# Patient Record
Sex: FEMALE | Race: WHITE | NOT HISPANIC OR LATINO | Employment: FULL TIME | URBAN - METROPOLITAN AREA
[De-identification: names, ages, dates, MRNs, and addresses within clinical notes are randomized per-mention and may not be internally consistent; named-entity substitution may affect disease eponyms.]

---

## 2019-10-22 ENCOUNTER — APPOINTMENT (OUTPATIENT)
Dept: RADIOLOGY | Facility: CLINIC | Age: 49
End: 2019-10-22
Payer: COMMERCIAL

## 2019-10-22 ENCOUNTER — OFFICE VISIT (OUTPATIENT)
Dept: OBGYN CLINIC | Facility: CLINIC | Age: 49
End: 2019-10-22
Payer: COMMERCIAL

## 2019-10-22 VITALS
SYSTOLIC BLOOD PRESSURE: 94 MMHG | BODY MASS INDEX: 20.25 KG/M2 | HEIGHT: 67 IN | HEART RATE: 50 BPM | WEIGHT: 129 LBS | DIASTOLIC BLOOD PRESSURE: 59 MMHG

## 2019-10-22 DIAGNOSIS — M79.641 BILATERAL HAND PAIN: ICD-10-CM

## 2019-10-22 DIAGNOSIS — M79.642 BILATERAL HAND PAIN: ICD-10-CM

## 2019-10-22 DIAGNOSIS — M65.341 TRIGGER RING FINGER OF RIGHT HAND: Primary | ICD-10-CM

## 2019-10-22 DIAGNOSIS — S63.611A SPRAIN OF LEFT INDEX FINGER, UNSPECIFIED SITE OF FINGER, INITIAL ENCOUNTER: ICD-10-CM

## 2019-10-22 PROCEDURE — 99203 OFFICE O/P NEW LOW 30 MIN: CPT | Performed by: ORTHOPAEDIC SURGERY

## 2019-10-22 PROCEDURE — 20600 DRAIN/INJ JOINT/BURSA W/O US: CPT | Performed by: ORTHOPAEDIC SURGERY

## 2019-10-22 PROCEDURE — 20550 NJX 1 TENDON SHEATH/LIGAMENT: CPT | Performed by: ORTHOPAEDIC SURGERY

## 2019-10-22 PROCEDURE — 73130 X-RAY EXAM OF HAND: CPT

## 2019-10-22 RX ORDER — TRIAMCINOLONE ACETONIDE 40 MG/ML
20 INJECTION, SUSPENSION INTRA-ARTICULAR; INTRAMUSCULAR
Status: COMPLETED | OUTPATIENT
Start: 2019-10-22 | End: 2019-10-22

## 2019-10-22 RX ORDER — LIDOCAINE HYDROCHLORIDE 5 MG/ML
0.5 INJECTION, SOLUTION INFILTRATION; PERINEURAL
Status: COMPLETED | OUTPATIENT
Start: 2019-10-22 | End: 2019-10-22

## 2019-10-22 RX ADMIN — LIDOCAINE HYDROCHLORIDE 0.5 ML: 5 INJECTION, SOLUTION INFILTRATION; PERINEURAL at 08:46

## 2019-10-22 RX ADMIN — TRIAMCINOLONE ACETONIDE 20 MG: 40 INJECTION, SUSPENSION INTRA-ARTICULAR; INTRAMUSCULAR at 08:46

## 2019-10-22 NOTE — PROGRESS NOTES
Assessment/Plan:  1  Trigger ring finger of right hand  Hand/upper extremity injection: R ring A1   2  Sprain of left index finger, unspecified site of finger, initial encounter  Small joint arthrocentesis: L index MCP   3  Bilateral hand pain  XR hand 3+ vw right    XR hand 3+ vw left       Scribe Attestation    I,:   Mady Sutton MA am acting as a scribe while in the presence of the attending physician :        I,:   Suzy Person, DO personally performed the services described in this documentation    as scribed in my presence :              I discussed with Monica Chnog today that her signs and symptoms are consistent with right ring finger trigger finger and left index finger sprain  She is tender to palpation over the right A1 pulley as well as obvious triggering  She is tender to palpation of left index RCL  Treatment options were discussed in the form of steroid injections  She was agreeable to this and these were performed in the office today without any complications  She will follow up in 3 months for repeat evaluation  Subjective:   Louise Garza is a 47 y o  female who presents to the office today for evaluation of bilateral hand pain  She notes triggering to her right ring finger  She states this has been ongoing since 2016  She states she will have to use the opposite hand to unlock the finger  She states she did try acupuncture for this in the past  She notes pain to the left index finger MCP  She states this has been ongoing since June when she was working a lot of days in a row  Patient does work as a dental hygienist        Review of Systems   Constitutional: Negative for chills and fever  HENT: Negative for drooling and sneezing  Eyes: Negative for redness  Respiratory: Negative for cough and wheezing  Gastrointestinal: Negative for nausea and vomiting  Musculoskeletal: Positive for arthralgias and myalgias  Negative for joint swelling     Neurological: Negative for weakness and numbness  Psychiatric/Behavioral: Negative for behavioral problems  The patient is not nervous/anxious  Past Medical History:   Diagnosis Date    Mcdonald's esophagus        Past Surgical History:   Procedure Laterality Date    HYSTERECTOMY  2017    TONSILECTOMY AND ADNOIDECTOMY         Family History   Problem Relation Age of Onset    Cancer Mother     Hypertension Mother     Osteoarthritis Father     No Known Problems Sister     No Known Problems Brother     No Known Problems Maternal Aunt     No Known Problems Maternal Uncle     No Known Problems Paternal Aunt     No Known Problems Paternal Uncle     No Known Problems Maternal Grandmother     No Known Problems Maternal Grandfather     No Known Problems Paternal Grandmother     No Known Problems Paternal Grandfather        Social History     Occupational History    Not on file   Tobacco Use    Smoking status: Never Smoker    Smokeless tobacco: Never Used   Substance and Sexual Activity    Alcohol use: Never     Frequency: Never    Drug use: Never    Sexual activity: Not on file       No current outpatient medications on file  Allergies   Allergen Reactions    Dust Mite Extract     Mold Extract [Trichophyton]     Penicillins        Objective:  Vitals:    10/22/19 0804   BP: 94/59   Pulse: (!) 50       Ortho Exam     Left index finger     TTP radial collateral ligament   Pain with ulnar stress MCP at 0   Compartments soft  Brisk capillary refill  S/m intact median, radial, and ulnar nerve    Right ring finger      Obvious triggering  Mild TTP A1 pulley   - tinel's  - kameron's     Compartments soft  Brisk capillary refill  S/m intact median, radial, and ulnar nerve      Physical Exam   Constitutional: She is oriented to person, place, and time  She appears well-developed and well-nourished  HENT:   Head: Normocephalic and atraumatic  Eyes: Conjunctivae are normal  Right eye exhibits no discharge   Left eye exhibits no discharge  Neck: Normal range of motion  Neck supple  Cardiovascular: Normal rate and intact distal pulses  Pulmonary/Chest: Effort normal  No respiratory distress  Musculoskeletal:   As noted in HPI   Neurological: She is alert and oriented to person, place, and time  Skin: Skin is warm and dry  Psychiatric: She has a normal mood and affect   Her behavior is normal  Judgment and thought content normal      Hand/upper extremity injection: R ring A1  Date/Time: 10/22/2019 8:46 AM  Consent given by: patient  Site marked: site marked  Timeout: Immediately prior to procedure a time out was called to verify the correct patient, procedure, equipment, support staff and site/side marked as required   Supporting Documentation  Indications: pain   Procedure Details  Condition:trigger finger Location: ring finger - R ring A1   Preparation: Patient was prepped and draped in the usual sterile fashion  Needle size: 27 G  Ultrasound guidance: no  Medications administered: 0 5 mL lidocaine 0 5 %; 20 mg triamcinolone acetonide 40 mg/mL    Patient tolerance: patient tolerated the procedure well with no immediate complications  Dressing:  Sterile dressing applied     Small joint arthrocentesis: L index MCP  Date/Time: 10/22/2019 8:46 AM  Consent given by: patient  Site marked: site marked  Timeout: Immediately prior to procedure a time out was called to verify the correct patient, procedure, equipment, support staff and site/side marked as required   Supporting Documentation  Indications: pain   Procedure Details  Location: index finger - L index MCP  Preparation: Patient was prepped and draped in the usual sterile fashion  Needle size: 27 G  Ultrasound guidance: no  Medications administered: 0 5 mL lidocaine 0 5 %; 20 mg triamcinolone acetonide 40 mg/mL    Patient tolerance: patient tolerated the procedure well with no immediate complications  Dressing:  Sterile dressing applied      I have personally reviewed pertinent films in PACS and my interpretation is as follows:X-ray bilateral hand performed in the office today demonstrates no osseous abnormalities

## 2019-12-16 ENCOUNTER — OFFICE VISIT (OUTPATIENT)
Dept: OBGYN CLINIC | Facility: CLINIC | Age: 49
End: 2019-12-16
Payer: COMMERCIAL

## 2019-12-16 VITALS
SYSTOLIC BLOOD PRESSURE: 111 MMHG | WEIGHT: 129 LBS | HEART RATE: 52 BPM | DIASTOLIC BLOOD PRESSURE: 61 MMHG | HEIGHT: 67 IN | BODY MASS INDEX: 20.25 KG/M2

## 2019-12-16 DIAGNOSIS — M77.8 RIGHT WRIST TENDINITIS: ICD-10-CM

## 2019-12-16 DIAGNOSIS — S63.611D: ICD-10-CM

## 2019-12-16 DIAGNOSIS — M65.341 TRIGGER RING FINGER OF RIGHT HAND: Primary | ICD-10-CM

## 2019-12-16 PROCEDURE — 99213 OFFICE O/P EST LOW 20 MIN: CPT | Performed by: ORTHOPAEDIC SURGERY

## 2019-12-16 NOTE — PROGRESS NOTES
Assessment/Plan:  1  Trigger ring finger of right hand     2  Sprain of left index finger, unspecified site of finger, subsequent encounter     3  Right wrist tendinitis         Scribe Attestation    I,:   Mady Sutton MA am acting as a scribe while in the presence of the attending physician :        I,:   Stan Shahid,  personally performed the services described in this documentation    as scribed in my presence :              Jocelyn Hills is doing well  She did see good relief from previous steroid injections  She does have signs and symptoms consistent with right wrist tendonitis  Treatment options were discussed in the form of a steroid injection and bracing  She deferred a steroid injection today  Patient was fitted and provided with a cock-up wrist brace  She may use this as needed  She was provided with a HEP for forearm strengthening  She may follow up with me as needed  Subjective:   Niesha Eric is a 47 y o  female who presents to the office today for follow up evaluation right ring finger trigger finger and left index finger sprain  Patient underwent steroid injections at her last visit on 10/22/19 which she states provided her with good pain relief  She still notes right ring finger triggering however, denies any pain  She states the pain to her left index finger has resolved  She notes pain to the volar aspect of her right wrist  She also notes pain to the IP joint of her left thumb as well as stiffness  She denies any numbness or tingling  Patient does note overall weakness and tiredness  Review of Systems   Constitutional: Negative for chills and fever  HENT: Negative for drooling and sneezing  Eyes: Negative for redness  Respiratory: Negative for cough and wheezing  Gastrointestinal: Negative for nausea and vomiting  Musculoskeletal: Positive for arthralgias and myalgias  Negative for joint swelling  Neurological: Negative for weakness and numbness  Psychiatric/Behavioral: Negative for behavioral problems  The patient is not nervous/anxious  Past Medical History:   Diagnosis Date    Mcdonald's esophagus        Past Surgical History:   Procedure Laterality Date    HYSTERECTOMY  2017    TONSILECTOMY AND ADNOIDECTOMY         Family History   Problem Relation Age of Onset    Cancer Mother     Hypertension Mother     Osteoarthritis Father     No Known Problems Sister     No Known Problems Brother     No Known Problems Maternal Aunt     No Known Problems Maternal Uncle     No Known Problems Paternal Aunt     No Known Problems Paternal Uncle     No Known Problems Maternal Grandmother     No Known Problems Maternal Grandfather     No Known Problems Paternal Grandmother     No Known Problems Paternal Grandfather        Social History     Occupational History    Not on file   Tobacco Use    Smoking status: Never Smoker    Smokeless tobacco: Never Used   Substance and Sexual Activity    Alcohol use: Never     Frequency: Never    Drug use: Never    Sexual activity: Not on file       No current outpatient medications on file  Allergies   Allergen Reactions    Dust Mite Extract     Mold Extract [Trichophyton]     Penicillins        Objective:  Vitals:    12/16/19 1747   BP: 111/61   Pulse: (!) 52       Ortho Exam     Right wrist    Obvious triggering ring finger  Compartments soft  Brisk capillary refill  S/m intact median, radial, and ulnar nerve    Left hand      Compartments soft  Brisk capillary refill  S/m intact median, radial, and ulnar nerve    Physical Exam   Constitutional: She is oriented to person, place, and time  She appears well-developed and well-nourished  HENT:   Head: Normocephalic and atraumatic  Eyes: Conjunctivae are normal  Right eye exhibits no discharge  Left eye exhibits no discharge  Neck: Normal range of motion  Neck supple  Cardiovascular: Normal rate and intact distal pulses     Pulmonary/Chest: Effort normal  No respiratory distress  Musculoskeletal:   As noted in HPI   Neurological: She is alert and oriented to person, place, and time  Skin: Skin is warm and dry  Psychiatric: She has a normal mood and affect   Her behavior is normal  Judgment and thought content normal        I have personally reviewed pertinent films in PACS and my interpretation is as follows:  **

## 2020-03-20 ENCOUNTER — OFFICE VISIT (OUTPATIENT)
Dept: OBGYN CLINIC | Facility: CLINIC | Age: 50
End: 2020-03-20
Payer: COMMERCIAL

## 2020-03-20 VITALS
SYSTOLIC BLOOD PRESSURE: 123 MMHG | BODY MASS INDEX: 20.25 KG/M2 | HEART RATE: 47 BPM | HEIGHT: 67 IN | WEIGHT: 129 LBS | DIASTOLIC BLOOD PRESSURE: 76 MMHG

## 2020-03-20 DIAGNOSIS — M65.341 TRIGGER RING FINGER OF RIGHT HAND: Primary | ICD-10-CM

## 2020-03-20 PROCEDURE — 20550 NJX 1 TENDON SHEATH/LIGAMENT: CPT | Performed by: ORTHOPAEDIC SURGERY

## 2020-03-20 PROCEDURE — 99213 OFFICE O/P EST LOW 20 MIN: CPT | Performed by: ORTHOPAEDIC SURGERY

## 2020-03-20 RX ORDER — LIDOCAINE HYDROCHLORIDE 5 MG/ML
0.5 INJECTION, SOLUTION INFILTRATION; PERINEURAL
Status: COMPLETED | OUTPATIENT
Start: 2020-03-20 | End: 2020-03-20

## 2020-03-20 RX ORDER — TRIAMCINOLONE ACETONIDE 40 MG/ML
20 INJECTION, SUSPENSION INTRA-ARTICULAR; INTRAMUSCULAR
Status: COMPLETED | OUTPATIENT
Start: 2020-03-20 | End: 2020-03-20

## 2020-03-20 RX ADMIN — TRIAMCINOLONE ACETONIDE 20 MG: 40 INJECTION, SUSPENSION INTRA-ARTICULAR; INTRAMUSCULAR at 10:24

## 2020-03-20 RX ADMIN — LIDOCAINE HYDROCHLORIDE 0.5 ML: 5 INJECTION, SOLUTION INFILTRATION; PERINEURAL at 10:24

## 2020-03-20 NOTE — PROGRESS NOTES
Assessment/Plan:  1  Trigger ring finger of right hand  Hand/upper extremity injection: R ring A1       Scribe Attestation    I,:   Mady Sutton MA am acting as a scribe while in the presence of the attending physician :        I,:   Meri Garcia DO personally performed the services described in this documentation    as scribed in my presence :              Tomas Juárez is doing well  She did see good relief from her previous steroid injection  She is tender to palpation over the right ring finger A1 pulley  There is obvious triggering  Treatment options were discussed in the form of a repeat steroid injection  She was agreeable to this  She consented and underwent a right ring finger trigger finger injection in the office today without any complications  We did briefly discuss trigger finger release in the future if she does not see good relief with conservative treatment options  She may follow up with me as needed  Subjective:   Ariadna Lombardo is a 47 y o  female who presents to the office today for follow up evaluation right ring finger trigger finger  Patient underwent a steroid injection on 10/22/19 which she states did provide her with complete relief  Patient states her pain and trigging began to return  She notes pain to the volar aspect of her MCP  She also notes "clicking" she denies any numbness or tingling  Review of Systems   Constitutional: Negative for chills and fever  HENT: Negative for drooling and sneezing  Eyes: Negative for redness  Respiratory: Negative for cough and wheezing  Gastrointestinal: Negative for nausea and vomiting  Musculoskeletal: Negative for arthralgias, joint swelling and myalgias  Neurological: Negative for weakness and numbness  Psychiatric/Behavioral: Negative for behavioral problems  The patient is not nervous/anxious            Past Medical History:   Diagnosis Date    Mcdonald's esophagus        Past Surgical History:   Procedure Laterality Date    HYSTERECTOMY  2017    TONSILECTOMY AND ADNOIDECTOMY         Family History   Problem Relation Age of Onset    Cancer Mother     Hypertension Mother     Osteoarthritis Father     No Known Problems Sister     No Known Problems Brother     No Known Problems Maternal Aunt     No Known Problems Maternal Uncle     No Known Problems Paternal Aunt     No Known Problems Paternal Uncle     No Known Problems Maternal Grandmother     No Known Problems Maternal Grandfather     No Known Problems Paternal Grandmother     No Known Problems Paternal Grandfather        Social History     Occupational History    Not on file   Tobacco Use    Smoking status: Never Smoker    Smokeless tobacco: Never Used   Substance and Sexual Activity    Alcohol use: Never     Frequency: Never    Drug use: Never    Sexual activity: Not on file       No current outpatient medications on file  Allergies   Allergen Reactions    Dust Mite Extract     Mold Extract [Trichophyton]     Penicillins        Objective:  Vitals:    03/20/20 1014   BP: 123/76   Pulse: (!) 47       Ortho Exam     Right ring finger    TTP A1 pulley  Obvious triggering  Compartments soft  Brisk capillary refill  S/m intact median, radial, and ulnar nerve     Physical Exam   Constitutional: She is oriented to person, place, and time  She appears well-developed and well-nourished  HENT:   Head: Normocephalic and atraumatic  Eyes: Conjunctivae are normal  Right eye exhibits no discharge  Left eye exhibits no discharge  Neck: Normal range of motion  Neck supple  Cardiovascular: Normal rate and intact distal pulses  Pulmonary/Chest: Effort normal  No respiratory distress  Musculoskeletal:   As noted in HPI   Neurological: She is alert and oriented to person, place, and time  Skin: Skin is warm and dry  Psychiatric: She has a normal mood and affect   Her behavior is normal  Judgment and thought content normal      Hand/upper extremity injection: R ring A1  Date/Time: 3/20/2020 10:24 AM  Consent given by: patient  Site marked: site marked  Timeout: Immediately prior to procedure a time out was called to verify the correct patient, procedure, equipment, support staff and site/side marked as required   Supporting Documentation  Indications: pain   Procedure Details  Condition:trigger finger Location: ring finger - R ring A1   Preparation: Patient was prepped and draped in the usual sterile fashion  Needle size: 27 G  Ultrasound guidance: no  Medications administered: 0 5 mL lidocaine 0 5 %; 20 mg triamcinolone acetonide 40 mg/mL    Patient tolerance: patient tolerated the procedure well with no immediate complications  Dressing:  Sterile dressing applied